# Patient Record
Sex: MALE | Employment: UNEMPLOYED | URBAN - NONMETROPOLITAN AREA
[De-identification: names, ages, dates, MRNs, and addresses within clinical notes are randomized per-mention and may not be internally consistent; named-entity substitution may affect disease eponyms.]

---

## 2019-08-28 ENCOUNTER — APPOINTMENT (OUTPATIENT)
Dept: CT IMAGING | Age: 54
DRG: 897 | End: 2019-08-28
Payer: COMMERCIAL

## 2019-08-28 ENCOUNTER — HOSPITAL ENCOUNTER (INPATIENT)
Age: 54
LOS: 2 days | Discharge: HOME | DRG: 897 | End: 2019-08-30
Attending: EMERGENCY MEDICINE | Admitting: INTERNAL MEDICINE
Payer: COMMERCIAL

## 2019-08-28 DIAGNOSIS — F10.921 ACUTE ALCOHOLIC INTOXICATION WITH DELIRIUM (HCC): Primary | ICD-10-CM

## 2019-08-28 PROBLEM — R93.89 ABNORMAL CT SCAN: Status: ACTIVE | Noted: 2019-08-28
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
## 2019-08-28 PROBLEM — M50.30 DDD (DEGENERATIVE DISC DISEASE), CERVICAL: Status: ACTIVE | Noted: 2019-08-28

## 2019-08-28 PROBLEM — R74.01 ELEVATED TRANSAMINASE LEVEL: Status: ACTIVE | Noted: 2019-08-28

## 2019-08-28 PROBLEM — F10.10 ALCOHOL ABUSE: Status: ACTIVE | Noted: 2019-08-28

## 2019-08-28 LAB
ACETAMINOPHEN LEVEL: < 5 UG/ML (ref 0–20)
ALBUMIN SERPL-MCNC: 4.7 G/DL (ref 3.5–5.1)
ALP BLD-CCNC: 89 U/L (ref 38–126)
ALT SERPL-CCNC: 62 U/L (ref 11–66)
AMPHETAMINE+METHAMPHETAMINE URINE SCREEN: NEGATIVE
ANION GAP SERPL CALCULATED.3IONS-SCNC: 17 MEQ/L (ref 8–16)
ANION GAP SERPL CALCULATED.3IONS-SCNC: 17 MEQ/L (ref 8–16)
APTT: 30.5 SECONDS (ref 22–38)
AST SERPL-CCNC: 85 U/L (ref 5–40)
BARBITURATE QUANTITATIVE URINE: NEGATIVE
BASE EXCESS MIXED: 1.5 MMOL/L (ref -2–3)
BASOPHILS # BLD: 0.6 %
BASOPHILS ABSOLUTE: 0 THOU/MM3 (ref 0–0.1)
BENZODIAZEPINE QUANTITATIVE URINE: NEGATIVE
BILIRUB SERPL-MCNC: 0.3 MG/DL (ref 0.3–1.2)
BILIRUBIN DIRECT: < 0.2 MG/DL (ref 0–0.3)
BILIRUBIN URINE: NEGATIVE
BLOOD, URINE: NEGATIVE
BUN BLDV-MCNC: 12 MG/DL (ref 7–22)
BUN BLDV-MCNC: 12 MG/DL (ref 7–22)
CALCIUM SERPL-MCNC: 8.9 MG/DL (ref 8.5–10.5)
CALCIUM SERPL-MCNC: 9.1 MG/DL (ref 8.5–10.5)
CANNABINOID QUANTITATIVE URINE: NEGATIVE
CHARACTER, URINE: CLEAR
CHLORIDE BLD-SCNC: 108 MEQ/L (ref 98–111)
CHLORIDE BLD-SCNC: 99 MEQ/L (ref 98–111)
CO2: 22 MEQ/L (ref 23–33)
CO2: 23 MEQ/L (ref 23–33)
COCAINE METABOLITE QUANTITATIVE URINE: NEGATIVE
COLLECTED BY:: ABNORMAL
COLOR: YELLOW
CREAT SERPL-MCNC: 0.7 MG/DL (ref 0.4–1.2)
CREAT SERPL-MCNC: 0.9 MG/DL (ref 0.4–1.2)
DEVICE: ABNORMAL
EKG ATRIAL RATE: 105 BPM
EKG ATRIAL RATE: 96 BPM
EKG P AXIS: 41 DEGREES
EKG P AXIS: 48 DEGREES
EKG P-R INTERVAL: 150 MS
EKG P-R INTERVAL: 166 MS
EKG Q-T INTERVAL: 346 MS
EKG Q-T INTERVAL: 366 MS
EKG QRS DURATION: 86 MS
EKG QRS DURATION: 92 MS
EKG QTC CALCULATION (BAZETT): 457 MS
EKG QTC CALCULATION (BAZETT): 462 MS
EKG R AXIS: -31 DEGREES
EKG R AXIS: 13 DEGREES
EKG T AXIS: 24 DEGREES
EKG T AXIS: 36 DEGREES
EKG VENTRICULAR RATE: 105 BPM
EKG VENTRICULAR RATE: 96 BPM
EOSINOPHIL # BLD: 1.2 %
EOSINOPHILS ABSOLUTE: 0.1 THOU/MM3 (ref 0–0.4)
ERYTHROCYTE [DISTWIDTH] IN BLOOD BY AUTOMATED COUNT: 12.8 % (ref 11.5–14.5)
ERYTHROCYTE [DISTWIDTH] IN BLOOD BY AUTOMATED COUNT: 13.1 % (ref 11.5–14.5)
ERYTHROCYTE [DISTWIDTH] IN BLOOD BY AUTOMATED COUNT: 41.7 FL (ref 35–45)
ERYTHROCYTE [DISTWIDTH] IN BLOOD BY AUTOMATED COUNT: 42.7 FL (ref 35–45)
ETHYL ALCOHOL, SERUM: 0.28 %
ETHYL ALCOHOL, SERUM: 0.56 %
FIO2, MIXED VENOUS: 2
GFR SERPL CREATININE-BSD FRML MDRD: 89 ML/MIN/1.73M2
GFR SERPL CREATININE-BSD FRML MDRD: > 90 ML/MIN/1.73M2
GLUCOSE BLD-MCNC: 119 MG/DL (ref 70–108)
GLUCOSE BLD-MCNC: 124 MG/DL (ref 70–108)
GLUCOSE URINE: NEGATIVE MG/DL
HCO3, MIXED: 27 MMOL/L (ref 23–28)
HCT VFR BLD CALC: 47.8 % (ref 42–52)
HCT VFR BLD CALC: 48.8 % (ref 42–52)
HEMOGLOBIN: 16.2 GM/DL (ref 14–18)
HEMOGLOBIN: 16.2 GM/DL (ref 14–18)
IMMATURE GRANS (ABS): 0.01 THOU/MM3 (ref 0–0.07)
IMMATURE GRANULOCYTES: 0 %
INR BLD: 0.9 (ref 0.85–1.13)
KETONES, URINE: NEGATIVE
LEUKOCYTE ESTERASE, URINE: NEGATIVE
LIPASE: 96.5 U/L (ref 5.6–51.3)
LYMPHOCYTES # BLD: 36.8 %
LYMPHOCYTES ABSOLUTE: 2.4 THOU/MM3 (ref 1–4.8)
MAGNESIUM: 2.2 MG/DL (ref 1.6–2.4)
MCH RBC QN AUTO: 29.9 PG (ref 26–33)
MCH RBC QN AUTO: 30.2 PG (ref 26–33)
MCHC RBC AUTO-ENTMCNC: 33.2 GM/DL (ref 32.2–35.5)
MCHC RBC AUTO-ENTMCNC: 33.9 GM/DL (ref 32.2–35.5)
MCV RBC AUTO: 89 FL (ref 80–94)
MCV RBC AUTO: 90.2 FL (ref 80–94)
MONOCYTES # BLD: 9.9 %
MONOCYTES ABSOLUTE: 0.6 THOU/MM3 (ref 0.4–1.3)
NITRITE, URINE: NEGATIVE
NUCLEATED RED BLOOD CELLS: 0 /100 WBC
O2 SAT, MIXED: 90 %
OPIATES, URINE: NEGATIVE
OSMOLALITY CALCULATION: 278.7 MOSMOL/KG (ref 275–300)
OXYCODONE: NEGATIVE
PCO2, MIXED VENOUS: 42 MMHG (ref 41–51)
PH UA: 6.5 (ref 5–9)
PH, MIXED: 7.41 (ref 7.31–7.41)
PHENCYCLIDINE QUANTITATIVE URINE: NEGATIVE
PLATELET # BLD: 146 THOU/MM3 (ref 130–400)
PLATELET # BLD: 148 THOU/MM3 (ref 130–400)
PMV BLD AUTO: 10.7 FL (ref 9.4–12.4)
PMV BLD AUTO: 10.8 FL (ref 9.4–12.4)
PO2 MIXED: 58 MMHG (ref 25–40)
POTASSIUM SERPL-SCNC: 3.6 MEQ/L (ref 3.5–5.2)
POTASSIUM SERPL-SCNC: 4.4 MEQ/L (ref 3.5–5.2)
PRO-BNP: 11.9 PG/ML (ref 0–900)
PROTEIN UA: NEGATIVE
RBC # BLD: 5.37 MILL/MM3 (ref 4.7–6.1)
RBC # BLD: 5.41 MILL/MM3 (ref 4.7–6.1)
SALICYLATE, SERUM: < 0.3 MG/DL (ref 2–10)
SEG NEUTROPHILS: 51.3 %
SEGMENTED NEUTROPHILS ABSOLUTE COUNT: 3.3 THOU/MM3 (ref 1.8–7.7)
SODIUM BLD-SCNC: 139 MEQ/L (ref 135–145)
SODIUM BLD-SCNC: 147 MEQ/L (ref 135–145)
SPECIFIC GRAVITY, URINE: 1.01 (ref 1–1.03)
TOTAL PROTEIN: 7 G/DL (ref 6.1–8)
TROPONIN T: < 0.01 NG/ML
TSH SERPL DL<=0.05 MIU/L-ACNC: 2.21 UIU/ML (ref 0.4–4.2)
UROBILINOGEN, URINE: 0.2 EU/DL (ref 0–1)
WBC # BLD: 4.4 THOU/MM3 (ref 4.8–10.8)
WBC # BLD: 6.5 THOU/MM3 (ref 4.8–10.8)

## 2019-08-28 PROCEDURE — G0480 DRUG TEST DEF 1-7 CLASSES: HCPCS

## 2019-08-28 PROCEDURE — 6820000001 HC L2 TRAUMA SURGERY EVALUATION

## 2019-08-28 PROCEDURE — 83880 ASSAY OF NATRIURETIC PEPTIDE: CPT

## 2019-08-28 PROCEDURE — 36415 COLL VENOUS BLD VENIPUNCTURE: CPT

## 2019-08-28 PROCEDURE — 93005 ELECTROCARDIOGRAM TRACING: CPT | Performed by: INTERNAL MEDICINE

## 2019-08-28 PROCEDURE — 84443 ASSAY THYROID STIM HORMONE: CPT

## 2019-08-28 PROCEDURE — 36600 WITHDRAWAL OF ARTERIAL BLOOD: CPT

## 2019-08-28 PROCEDURE — 6370000000 HC RX 637 (ALT 250 FOR IP): Performed by: INTERNAL MEDICINE

## 2019-08-28 PROCEDURE — 99223 1ST HOSP IP/OBS HIGH 75: CPT | Performed by: SURGERY

## 2019-08-28 PROCEDURE — 85025 COMPLETE CBC W/AUTO DIFF WBC: CPT

## 2019-08-28 PROCEDURE — 99220 PR INITIAL OBSERVATION CARE/DAY 70 MINUTES: CPT | Performed by: INTERNAL MEDICINE

## 2019-08-28 PROCEDURE — 85610 PROTHROMBIN TIME: CPT

## 2019-08-28 PROCEDURE — 2500000003 HC RX 250 WO HCPCS: Performed by: PHYSICIAN ASSISTANT

## 2019-08-28 PROCEDURE — 85027 COMPLETE CBC AUTOMATED: CPT

## 2019-08-28 PROCEDURE — 2580000003 HC RX 258: Performed by: INTERNAL MEDICINE

## 2019-08-28 PROCEDURE — 1200000003 HC TELEMETRY R&B

## 2019-08-28 PROCEDURE — 6360000002 HC RX W HCPCS: Performed by: EMERGENCY MEDICINE

## 2019-08-28 PROCEDURE — 76376 3D RENDER W/INTRP POSTPROCES: CPT

## 2019-08-28 PROCEDURE — 71260 CT THORAX DX C+: CPT

## 2019-08-28 PROCEDURE — 93010 ELECTROCARDIOGRAM REPORT: CPT | Performed by: NUCLEAR MEDICINE

## 2019-08-28 PROCEDURE — 85730 THROMBOPLASTIN TIME PARTIAL: CPT

## 2019-08-28 PROCEDURE — 70450 CT HEAD/BRAIN W/O DYE: CPT

## 2019-08-28 PROCEDURE — 93005 ELECTROCARDIOGRAM TRACING: CPT | Performed by: EMERGENCY MEDICINE

## 2019-08-28 PROCEDURE — 2709999900 HC NON-CHARGEABLE SUPPLY

## 2019-08-28 PROCEDURE — 94761 N-INVAS EAR/PLS OXIMETRY MLT: CPT

## 2019-08-28 PROCEDURE — 74177 CT ABD & PELVIS W/CONTRAST: CPT

## 2019-08-28 PROCEDURE — 2500000003 HC RX 250 WO HCPCS: Performed by: EMERGENCY MEDICINE

## 2019-08-28 PROCEDURE — 80053 COMPREHEN METABOLIC PANEL: CPT

## 2019-08-28 PROCEDURE — 72125 CT NECK SPINE W/O DYE: CPT

## 2019-08-28 PROCEDURE — 81003 URINALYSIS AUTO W/O SCOPE: CPT

## 2019-08-28 PROCEDURE — 80307 DRUG TEST PRSMV CHEM ANLYZR: CPT

## 2019-08-28 PROCEDURE — 80048 BASIC METABOLIC PNL TOTAL CA: CPT

## 2019-08-28 PROCEDURE — 83735 ASSAY OF MAGNESIUM: CPT

## 2019-08-28 PROCEDURE — 2580000003 HC RX 258: Performed by: EMERGENCY MEDICINE

## 2019-08-28 PROCEDURE — 2700000000 HC OXYGEN THERAPY PER DAY

## 2019-08-28 PROCEDURE — 6360000002 HC RX W HCPCS: Performed by: INTERNAL MEDICINE

## 2019-08-28 PROCEDURE — 83690 ASSAY OF LIPASE: CPT

## 2019-08-28 PROCEDURE — 99285 EMERGENCY DEPT VISIT HI MDM: CPT

## 2019-08-28 PROCEDURE — 84484 ASSAY OF TROPONIN QUANT: CPT

## 2019-08-28 PROCEDURE — 82248 BILIRUBIN DIRECT: CPT

## 2019-08-28 PROCEDURE — 6360000004 HC RX CONTRAST MEDICATION: Performed by: EMERGENCY MEDICINE

## 2019-08-28 PROCEDURE — L0120 CERV FLEX N/ADJ FOAM PRE OTS: HCPCS

## 2019-08-28 RX ORDER — LORAZEPAM 2 MG/ML
2 INJECTION INTRAMUSCULAR
Status: DISCONTINUED | OUTPATIENT
Start: 2019-08-28 | End: 2019-08-30 | Stop reason: HOSPADM

## 2019-08-28 RX ORDER — IBUPROFEN 400 MG/1
400 TABLET ORAL EVERY 6 HOURS PRN
Status: DISCONTINUED | OUTPATIENT
Start: 2019-08-28 | End: 2019-08-30 | Stop reason: HOSPADM

## 2019-08-28 RX ORDER — ONDANSETRON 2 MG/ML
4 INJECTION INTRAMUSCULAR; INTRAVENOUS EVERY 6 HOURS PRN
Status: DISCONTINUED | OUTPATIENT
Start: 2019-08-28 | End: 2019-08-30 | Stop reason: HOSPADM

## 2019-08-28 RX ORDER — SODIUM CHLORIDE 9 MG/ML
INJECTION, SOLUTION INTRAVENOUS CONTINUOUS
Status: DISCONTINUED | OUTPATIENT
Start: 2019-08-28 | End: 2019-08-30 | Stop reason: HOSPADM

## 2019-08-28 RX ORDER — SODIUM CHLORIDE 0.9 % (FLUSH) 0.9 %
10 SYRINGE (ML) INJECTION PRN
Status: DISCONTINUED | OUTPATIENT
Start: 2019-08-28 | End: 2019-08-30 | Stop reason: HOSPADM

## 2019-08-28 RX ORDER — LORAZEPAM 2 MG/ML
4 INJECTION INTRAMUSCULAR
Status: DISCONTINUED | OUTPATIENT
Start: 2019-08-28 | End: 2019-08-30 | Stop reason: HOSPADM

## 2019-08-28 RX ORDER — LORAZEPAM 1 MG/1
3 TABLET ORAL
Status: DISCONTINUED | OUTPATIENT
Start: 2019-08-28 | End: 2019-08-30 | Stop reason: HOSPADM

## 2019-08-28 RX ORDER — LORAZEPAM 1 MG/1
4 TABLET ORAL
Status: DISCONTINUED | OUTPATIENT
Start: 2019-08-28 | End: 2019-08-30 | Stop reason: HOSPADM

## 2019-08-28 RX ORDER — LORAZEPAM 1 MG/1
1 TABLET ORAL
Status: DISCONTINUED | OUTPATIENT
Start: 2019-08-28 | End: 2019-08-30 | Stop reason: HOSPADM

## 2019-08-28 RX ORDER — LORAZEPAM 1 MG/1
2 TABLET ORAL
Status: DISCONTINUED | OUTPATIENT
Start: 2019-08-28 | End: 2019-08-30 | Stop reason: HOSPADM

## 2019-08-28 RX ORDER — SODIUM CHLORIDE 450 MG/100ML
INJECTION, SOLUTION INTRAVENOUS CONTINUOUS
Status: DISCONTINUED | OUTPATIENT
Start: 2019-08-28 | End: 2019-08-30 | Stop reason: HOSPADM

## 2019-08-28 RX ORDER — SODIUM CHLORIDE 0.9 % (FLUSH) 0.9 %
10 SYRINGE (ML) INJECTION EVERY 12 HOURS SCHEDULED
Status: DISCONTINUED | OUTPATIENT
Start: 2019-08-28 | End: 2019-08-30 | Stop reason: HOSPADM

## 2019-08-28 RX ORDER — LORAZEPAM 2 MG/ML
3 INJECTION INTRAMUSCULAR
Status: DISCONTINUED | OUTPATIENT
Start: 2019-08-28 | End: 2019-08-30 | Stop reason: HOSPADM

## 2019-08-28 RX ORDER — LABETALOL 20 MG/4 ML (5 MG/ML) INTRAVENOUS SYRINGE
10 EVERY 6 HOURS PRN
Status: DISCONTINUED | OUTPATIENT
Start: 2019-08-28 | End: 2019-08-30 | Stop reason: HOSPADM

## 2019-08-28 RX ORDER — LORAZEPAM 2 MG/ML
1 INJECTION INTRAMUSCULAR
Status: DISCONTINUED | OUTPATIENT
Start: 2019-08-28 | End: 2019-08-30 | Stop reason: HOSPADM

## 2019-08-28 RX ORDER — ACETAMINOPHEN 325 MG/1
650 TABLET ORAL EVERY 4 HOURS PRN
Status: DISCONTINUED | OUTPATIENT
Start: 2019-08-28 | End: 2019-08-30 | Stop reason: HOSPADM

## 2019-08-28 RX ADMIN — LORAZEPAM 3 MG: 2 INJECTION INTRAMUSCULAR; INTRAVENOUS at 07:04

## 2019-08-28 RX ADMIN — SODIUM CHLORIDE: 4.5 INJECTION, SOLUTION INTRAVENOUS at 19:52

## 2019-08-28 RX ADMIN — FOLIC ACID: 5 INJECTION, SOLUTION INTRAMUSCULAR; INTRAVENOUS; SUBCUTANEOUS at 03:06

## 2019-08-28 RX ADMIN — ACETAMINOPHEN 650 MG: 325 TABLET ORAL at 08:36

## 2019-08-28 RX ADMIN — SODIUM CHLORIDE: 9 INJECTION, SOLUTION INTRAVENOUS at 10:18

## 2019-08-28 RX ADMIN — IOPAMIDOL 80 ML: 755 INJECTION, SOLUTION INTRAVENOUS at 00:47

## 2019-08-28 RX ADMIN — IBUPROFEN 400 MG: 400 TABLET ORAL at 16:31

## 2019-08-28 RX ADMIN — LABETALOL 20 MG/4 ML (5 MG/ML) INTRAVENOUS SYRINGE 10 MG: at 23:37

## 2019-08-28 ASSESSMENT — PAIN DESCRIPTION - DESCRIPTORS: DESCRIPTORS: HEADACHE

## 2019-08-28 ASSESSMENT — PAIN DESCRIPTION - LOCATION: LOCATION: HEAD

## 2019-08-28 ASSESSMENT — PAIN DESCRIPTION - PROGRESSION
CLINICAL_PROGRESSION: GRADUALLY IMPROVING

## 2019-08-28 ASSESSMENT — PAIN SCALES - GENERAL
PAINLEVEL_OUTOF10: 8
PAINLEVEL_OUTOF10: 0
PAINLEVEL_OUTOF10: 0
PAINLEVEL_OUTOF10: 4

## 2019-08-28 ASSESSMENT — PAIN DESCRIPTION - FREQUENCY: FREQUENCY: INTERMITTENT

## 2019-08-28 ASSESSMENT — PAIN DESCRIPTION - PAIN TYPE: TYPE: ACUTE PAIN

## 2019-08-28 ASSESSMENT — PAIN DESCRIPTION - ONSET: ONSET: SUDDEN

## 2019-08-28 ASSESSMENT — PAIN - FUNCTIONAL ASSESSMENT: PAIN_FUNCTIONAL_ASSESSMENT: ACTIVITIES ARE NOT PREVENTED

## 2019-08-28 NOTE — ED PROVIDER NOTES
BLOOD GAS, VENOUS - Abnormal; Notable for the following components:    PO2, Mixed 58 (*)     All other components within normal limits   ANION GAP - Abnormal; Notable for the following components:    Anion Gap 17.0 (*)     All other components within normal limits   GLOMERULAR FILTRATION RATE, ESTIMATED - Abnormal; Notable for the following components:    Est, Glom Filt Rate 89 (*)     All other components within normal limits   PROTIME-INR   APTT   CBC WITH AUTO DIFFERENTIAL   BRAIN NATRIURETIC PEPTIDE   TROPONIN   MAGNESIUM   TSH WITHOUT REFLEX   ACETAMINOPHEN LEVEL   ETHANOL   URINE DRUG SCREEN   URINE RT REFLEX TO CULTURE   OSMOLALITY   BLOOD GAS, ARTERIAL   URINE DRUG SCREEN   TROPONIN   TROPONIN   CBC   BASIC METABOLIC PANEL       EMERGENCY DEPARTMENT COURSE:   Vitals:    Vitals:    08/28/19 0112 08/28/19 0122 08/28/19 0157 08/28/19 0257   BP: (!) 144/102  (!) 157/115 (!) 155/117   Pulse: 92  103 111   Resp: 19 23 19   Temp:  98 °F (36.7 °C)     SpO2: 96%  95% 97%     0020 Labs and imaging ordered. Patient will open eyes to his name, but is otherwise unresponsive. He does not respond to sternal rub, per nurse    The patient comes in today with acute alcohol intoxication resulting in a fall. Wife tells us patient has been drinking for the past 3 days. Patient came in by EMS on responsive to name. He is mildly hypertensive. Vital signs are otherwise within normal limits. He was placed on O2 NC with sonorous respirations. EKG shows NSR. He does not respond to sternal rub or painful stimuli. Patient apparently fell down 4 stair steps, striking his head. C-collar was on upon arrival. Labs and imaging studies were ordered. ETOH is 0.56. Urine drug screen is negative. AST is 85. Lipase is 96.5. GFR is 89. Anion gap is 17.0. ABG was ordered. PO2 is 58. Imaging including CT thoracic/lumbar reconstruction, head, cervical spine, chest, and abdomen pelvis have no acute findings.  I discussed patient's case with

## 2019-08-28 NOTE — H&P
atelectasis lung base changes. CT lumbar spine with reconstruction. FINDINGS: There is preservation of alignment of the vertebral column with multilevel degenerative endplate changes. There is no gross retropulsion. The paraspinal soft tissues are intact. There is redemonstration of old nonunited transverse process    fracture at L3 on the left. Disc levels: L1-2: The disc space is maintained. The central canal and foramen are normal.      L2-3: The disc space is within normal limits. The central canal and foramen are normal.      L3-4: There are degenerative disc changes without acute central canal or obvious foramen impingement. L4-5: There is posterior disc bulge which combines with facet arthropathy and ligamentum flavum thickening produce chronic appearing central canal narrowing. There is no acute foraminal impingement. L5-S1: There is degenerative disc narrowing with posterior broad-based disc bulge which does not produce severe narrowing of the central canal. Bilateral foramen are intact. There is mild facet arthropathy. There is no visualized aneurysm. Left and right kidney are negative for obstructive uropathy. There is no paraspinal fluid collections. IMPRESSION:   1. Negative exam for acute fracture deformity of the lumbar spine or obvious impingement of the central canal.         2. Prior nonunited fracture changes of the left L3 transverse process. 3. Degenerative disc and endplate changes described above at individual levels. **This report has been created using voice recognition software. It may contain minor errors which are inherent in voice recognition technology. **      Final report electronically signed by Dr. Stefan Thomason on 8/28/2019 1:33 AM      CT CHEST W CONTRAST   Final Result   Negative CT of the chest for active appearing pathology.       CT of the thoracic spine with reconstruction      Indication trauma      FINDINGS: There is overall IMPRESSION:   1. Multilevel degenerative changes of the thoracic spine. Negative study for acute fracture or deformity of the central canal.      **This report has been created using voice recognition software. It may contain minor errors which are inherent in voice recognition technology. **      Final report electronically signed by Dr. Ginna Willis on 8/28/2019 1:26 AM               DVT prophylaxis: [] Lovenox                                 [x] SCDs                                 [] SQ Heparin                                 [] Encourage ambulation           [] Already on Anticoagulation    Code Status: Full Code      PT/OT Eval Status: Encouraged if warranted    Disposition:    [] Home       [] TCU       [] Rehab       [] Psych       [] SNF       [] [facility]       [x] Other- Need further investigation    ASSESSMENT:    Active Hospital Problems    Diagnosis Date Noted    Fall [W19. XXXA] 08/28/2019    Alcohol abuse [F10.10] 08/28/2019    DDD (degenerative disc disease), cervical [M50.30] 08/28/2019    Abnormal CT scan [R93.89] 08/28/2019    Elevated transaminase level [R74.0] 08/28/2019       PLAN:    Admit to Telemetry Unit  C-Collar to remain in place until cleared by Trauma Team  Diet regular  IVF hydration as tolerated  Analgesics PRN  Antiemetics PRN  DVT prophylaxis  PT/OT encouraged, if warranted  IS  Troponin trend  EKG in AM  Consult trauma surgery  Will need to re-evaluate home medications in morning  Continue to monitor closely       Thank you No primary care provider on file. for the opportunity to be involved in this patient's care.     Electronically signed by Rere Yung DO on 8/28/2019 at 6:02 AM

## 2019-08-29 LAB
ALBUMIN SERPL-MCNC: 4.6 G/DL (ref 3.5–5.1)
ALP BLD-CCNC: 84 U/L (ref 38–126)
ALT SERPL-CCNC: 61 U/L (ref 11–66)
ANION GAP SERPL CALCULATED.3IONS-SCNC: 14 MEQ/L (ref 8–16)
AST SERPL-CCNC: 87 U/L (ref 5–40)
BASOPHILS # BLD: 0.7 %
BASOPHILS ABSOLUTE: 0 THOU/MM3 (ref 0–0.1)
BILIRUB SERPL-MCNC: 1.5 MG/DL (ref 0.3–1.2)
BILIRUBIN DIRECT: 0.4 MG/DL (ref 0–0.3)
BUN BLDV-MCNC: 8 MG/DL (ref 7–22)
CALCIUM SERPL-MCNC: 9.5 MG/DL (ref 8.5–10.5)
CHLORIDE BLD-SCNC: 98 MEQ/L (ref 98–111)
CO2: 26 MEQ/L (ref 23–33)
CREAT SERPL-MCNC: 0.7 MG/DL (ref 0.4–1.2)
EOSINOPHIL # BLD: 2 %
EOSINOPHILS ABSOLUTE: 0.1 THOU/MM3 (ref 0–0.4)
ERYTHROCYTE [DISTWIDTH] IN BLOOD BY AUTOMATED COUNT: 12.4 % (ref 11.5–14.5)
ERYTHROCYTE [DISTWIDTH] IN BLOOD BY AUTOMATED COUNT: 40.1 FL (ref 35–45)
ETHYL ALCOHOL, SERUM: < 0.01 %
GFR SERPL CREATININE-BSD FRML MDRD: > 90 ML/MIN/1.73M2
GLUCOSE BLD-MCNC: 108 MG/DL (ref 70–108)
HCT VFR BLD CALC: 44.9 % (ref 42–52)
HEMOGLOBIN: 15.5 GM/DL (ref 14–18)
IMMATURE GRANS (ABS): 0.02 THOU/MM3 (ref 0–0.07)
IMMATURE GRANULOCYTES: 1 %
LYMPHOCYTES # BLD: 24.1 %
LYMPHOCYTES ABSOLUTE: 1.1 THOU/MM3 (ref 1–4.8)
MAGNESIUM: 1.4 MG/DL (ref 1.6–2.4)
MCH RBC QN AUTO: 30.5 PG (ref 26–33)
MCHC RBC AUTO-ENTMCNC: 34.5 GM/DL (ref 32.2–35.5)
MCV RBC AUTO: 88.4 FL (ref 80–94)
MONOCYTES # BLD: 13.3 %
MONOCYTES ABSOLUTE: 0.6 THOU/MM3 (ref 0.4–1.3)
NUCLEATED RED BLOOD CELLS: 0 /100 WBC
PLATELET # BLD: 114 THOU/MM3 (ref 130–400)
PMV BLD AUTO: 10.7 FL (ref 9.4–12.4)
POTASSIUM SERPL-SCNC: 3.9 MEQ/L (ref 3.5–5.2)
RBC # BLD: 5.08 MILL/MM3 (ref 4.7–6.1)
SEG NEUTROPHILS: 59.4 %
SEGMENTED NEUTROPHILS ABSOLUTE COUNT: 2.6 THOU/MM3 (ref 1.8–7.7)
SODIUM BLD-SCNC: 138 MEQ/L (ref 135–145)
TOTAL PROTEIN: 6.7 G/DL (ref 6.1–8)
WBC # BLD: 4.4 THOU/MM3 (ref 4.8–10.8)

## 2019-08-29 PROCEDURE — 2500000003 HC RX 250 WO HCPCS: Performed by: PHYSICIAN ASSISTANT

## 2019-08-29 PROCEDURE — 1200000003 HC TELEMETRY R&B

## 2019-08-29 PROCEDURE — 80053 COMPREHEN METABOLIC PANEL: CPT

## 2019-08-29 PROCEDURE — 85025 COMPLETE CBC W/AUTO DIFF WBC: CPT

## 2019-08-29 PROCEDURE — 83735 ASSAY OF MAGNESIUM: CPT

## 2019-08-29 PROCEDURE — 2580000003 HC RX 258: Performed by: INTERNAL MEDICINE

## 2019-08-29 PROCEDURE — 99233 SBSQ HOSP IP/OBS HIGH 50: CPT | Performed by: PHYSICIAN ASSISTANT

## 2019-08-29 PROCEDURE — 82248 BILIRUBIN DIRECT: CPT

## 2019-08-29 PROCEDURE — 6370000000 HC RX 637 (ALT 250 FOR IP): Performed by: PHYSICIAN ASSISTANT

## 2019-08-29 PROCEDURE — 36415 COLL VENOUS BLD VENIPUNCTURE: CPT

## 2019-08-29 PROCEDURE — G0480 DRUG TEST DEF 1-7 CLASSES: HCPCS

## 2019-08-29 RX ORDER — LISINOPRIL 10 MG/1
10 TABLET ORAL DAILY
Status: DISCONTINUED | OUTPATIENT
Start: 2019-08-29 | End: 2019-08-30 | Stop reason: HOSPADM

## 2019-08-29 RX ADMIN — SODIUM CHLORIDE: 4.5 INJECTION, SOLUTION INTRAVENOUS at 04:27

## 2019-08-29 RX ADMIN — LABETALOL 20 MG/4 ML (5 MG/ML) INTRAVENOUS SYRINGE 10 MG: at 13:46

## 2019-08-29 RX ADMIN — LISINOPRIL 10 MG: 10 TABLET ORAL at 18:49

## 2019-08-29 ASSESSMENT — PAIN SCALES - GENERAL
PAINLEVEL_OUTOF10: 0
PAINLEVEL_OUTOF10: 0

## 2019-08-30 VITALS
SYSTOLIC BLOOD PRESSURE: 148 MMHG | HEART RATE: 60 BPM | OXYGEN SATURATION: 96 % | TEMPERATURE: 98.1 F | DIASTOLIC BLOOD PRESSURE: 93 MMHG | RESPIRATION RATE: 16 BRPM

## 2019-08-30 PROCEDURE — 6370000000 HC RX 637 (ALT 250 FOR IP): Performed by: PHYSICIAN ASSISTANT

## 2019-08-30 PROCEDURE — 99239 HOSP IP/OBS DSCHRG MGMT >30: CPT | Performed by: PHYSICIAN ASSISTANT

## 2019-08-30 PROCEDURE — 6370000000 HC RX 637 (ALT 250 FOR IP): Performed by: INTERNAL MEDICINE

## 2019-08-30 PROCEDURE — 2580000003 HC RX 258: Performed by: INTERNAL MEDICINE

## 2019-08-30 RX ORDER — LISINOPRIL 10 MG/1
10 TABLET ORAL DAILY
Qty: 30 TABLET | Refills: 3 | Status: SHIPPED | OUTPATIENT
Start: 2019-08-31

## 2019-08-30 RX ADMIN — IBUPROFEN 400 MG: 400 TABLET ORAL at 02:25

## 2019-08-30 RX ADMIN — SODIUM CHLORIDE: 4.5 INJECTION, SOLUTION INTRAVENOUS at 02:23

## 2019-08-30 RX ADMIN — LISINOPRIL 10 MG: 10 TABLET ORAL at 09:48

## 2019-08-30 ASSESSMENT — PAIN SCALES - GENERAL
PAINLEVEL_OUTOF10: 5
PAINLEVEL_OUTOF10: 0

## 2019-08-30 ASSESSMENT — PAIN DESCRIPTION - PAIN TYPE: TYPE: ACUTE PAIN

## 2019-08-30 ASSESSMENT — PAIN DESCRIPTION - LOCATION: LOCATION: HEAD

## 2019-08-30 ASSESSMENT — PAIN DESCRIPTION - ORIENTATION: ORIENTATION: MID;RIGHT

## 2019-08-30 ASSESSMENT — PAIN DESCRIPTION - DESCRIPTORS: DESCRIPTORS: ACHING

## 2019-08-30 NOTE — PLAN OF CARE
Problem: Falls - Risk of:  Goal: Will remain free from falls  Description  Will remain free from falls  Outcome: Ongoing  Note:   Up with standby assist gait is steady no falls this shift      Problem: Falls - Risk of:  Goal: Absence of physical injury  Description  Absence of physical injury  Outcome: Ongoing  Note:   No evidence of physical injury noted      Problem: Fluid Volume - Deficit:  Goal: Absence of fluid volume deficit signs and symptoms  Description  Absence of fluid volume deficit signs and symptoms  Outcome: Ongoing  Note:   Taking in adequate amounts of fluids      Problem: Nutrition Deficit:  Goal: Ability to achieve adequate nutritional intake will improve  Description  Ability to achieve adequate nutritional intake will improve  Outcome: Ongoing  Note:   Taking in adequate amounts of nutrition      Problem: Sleep Pattern Disturbance:  Goal: Appears well-rested  Description  Appears well-rested  Outcome: Ongoing  Note:   Frequent periods of rest appears well rested      Problem: Violence - Risk of, Self/Other-Directed:  Goal: Knowledge of developmental care interventions  Description  Absence of violence  Outcome: Ongoing  Note:   Calm and cooperative no violent actions noted this shift      Problem: Pain:  Goal: Pain level will decrease  Description  Pain level will decrease  Outcome: Ongoing  Note:   Denies any pain this shift      Problem: Discharge Planning:  Goal: Discharged to appropriate level of care  Description  Discharged to appropriate level of care  Outcome: Ongoing  Note:   Plans home with wife at discharge vs going to an alcohol withdrawal facility   Care plan reviewed with patient. Patient verbalize understanding of the plan of care and contribute to goal setting.

## 2019-08-30 NOTE — DISCHARGE SUMMARY
Ativan. C-Collar removed. Trauma signed off.   8/30 - Pt did not need any Ativan for the last 48 hours ago. Pt did not show any symptoms of lethal alcohol WD. Pt agreed with going home and thought he was safe. Pt was in touch with a rehab center in [place]. Pt stated that he did not need anything from our . On the day of discharge, it was explained to the patient that it was very important to follow up with his PCP to have continued care. Appointments were made and information was given. Exam:     Vitals:  Vitals:    08/30/19 0001 08/30/19 0002 08/30/19 0400 08/30/19 0800   BP: (!) 166/102 (!) 152/100 (!) 148/95 (!) 148/93   Pulse: 83 85 73 60   Resp: 16 16  16   Temp:  98 °F (36.7 °C)  98.1 °F (36.7 °C)   TempSrc:  Oral  Oral   SpO2: 97% 96%  96%     Weight:       24 hour intake/output:    Intake/Output Summary (Last 24 hours) at 8/30/2019 1130  Last data filed at 8/30/2019 0600  Gross per 24 hour   Intake 2675.6 ml   Output --   Net 2675.6 ml         General appearance:  No apparent distress, appears stated age and cooperative. HEENT:  Normal cephalic, atraumatic without obvious deformity. Pupils equal, round, and reactive to light. Extra ocular muscles intact. Conjunctivae/corneas clear. Neck: Supple, with full range of motion. No jugular venous distention. Trachea midline. Respiratory:  Normal respiratory effort on RA. Clear to auscultation, bilaterally without Rales/Wheezes/Rhonchi. Cardiovascular:  Regular rate and rhythm with normal S1/S2 without murmurs, rubs or gallops. Abdomen: Soft, non-tender, non-distended with normal bowel sounds. Musculoskeletal:  No clubbing, cyanosis or edema bilaterally. Full range of motion without deformity. Skin: Skin color, texture, turgor normal.  No rashes or lesions. Neurologic:  Neurovascularly intact without any focal sensory/motor deficits.  Cranial nerves: II-XII intact, grossly non-focal.  Psychiatric:  Alert and oriented, thought content Final Result    No evidence of an acute process. **This report has been created using voice recognition software. It may contain minor errors which are inherent in voice recognition technology. **      Final report electronically signed by Dr. Detsiny Jordan on 8/28/2019 1:17 AM      CT LUMBAR RECONSTRUCTION WO POST PROCESS   Final Result   1. Old ununited fracture changes of the left L3 transverse process. 2. Negative exam of the abdomen and pelvis for acute abnormality. 3. Minimal bibasilar subsegmental atelectasis lung base changes. CT lumbar spine with reconstruction. FINDINGS: There is preservation of alignment of the vertebral column with multilevel degenerative endplate changes. There is no gross retropulsion. The paraspinal soft tissues are intact. There is redemonstration of old nonunited transverse process    fracture at L3 on the left. Disc levels: L1-2: The disc space is maintained. The central canal and foramen are normal.      L2-3: The disc space is within normal limits. The central canal and foramen are normal.      L3-4: There are degenerative disc changes without acute central canal or obvious foramen impingement. L4-5: There is posterior disc bulge which combines with facet arthropathy and ligamentum flavum thickening produce chronic appearing central canal narrowing. There is no acute foraminal impingement. L5-S1: There is degenerative disc narrowing with posterior broad-based disc bulge which does not produce severe narrowing of the central canal. Bilateral foramen are intact. There is mild facet arthropathy. There is no visualized aneurysm. Left and right kidney are negative for obstructive uropathy. There is no paraspinal fluid collections. IMPRESSION:   1.  Negative exam for acute fracture deformity of the lumbar spine or obvious impingement of the central canal.         2. Prior nonunited fracture changes of the left L3 transverse process. 3. Degenerative disc and endplate changes described above at individual levels. **This report has been created using voice recognition software. It may contain minor errors which are inherent in voice recognition technology. **      Final report electronically signed by Dr. Gigi Irwin on 8/28/2019 1:33 AM      CT THORACIC RECONSTRUCTION WO POST PROCESS   Final Result   Negative CT of the chest for active appearing pathology. CT of the thoracic spine with reconstruction      Indication trauma      FINDINGS: There is overall preservation of alignment of the vertebral column with multilevel degenerative endplate changes and mild disc narrowing. There is no acute retropulsion or deformity the central canal as visualized. The paraspinal soft tissues    are grossly normal. The descending thoracic aorta is normal caliber. IMPRESSION:   1. Multilevel degenerative changes of the thoracic spine. Negative study for acute fracture or deformity of the central canal.      **This report has been created using voice recognition software. It may contain minor errors which are inherent in voice recognition technology. **      Final report electronically signed by Dr. Gigi Irwin on 8/28/2019 1:26 AM             Consults:     STR ED TO IP CONSULT  IP CONSULT TO TRAUMA SURGERY  IP CONSULT TO ADDICTION SERVICES    Disposition:    [x] Home       [] TCU       [] Rehab       [] Psych       [] SNF       [] [facility]       [] Other-    Condition at Discharge: Stable    Code Status:  Full Code     Patient Instructions:    Discharge lab work: None at this time   Activity: activity as tolerated  Diet: DIET GENERAL;      Follow-up visits:   Stanley Lamb MD  [facility] [address]  [phone]    In 1 week           Discharge Medications:      Kory Hollingsworth, [address] Medication Instructions [id number]    Printed on:08/30/19 4744   Medication Information
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
## 2022-11-06 NOTE — ED NOTES
Bed: 007A  Expected date:   Expected time:   Means of arrival:   Comments:     Afia Aguirre RN  08/28/19 0009
Spoke with pt wife Darci Dillard and updated on POC and planned admission to 7k24. She stated she would be up in the morning after dropping kids off at school and to call for any problems.  Deena Lozada [phone]     Via Yasmeen Brooks RN  08/28/19 9238
96

## 2024-01-01 ENCOUNTER — HOSPITAL ENCOUNTER (EMERGENCY)
Age: 59
End: 2024-03-06
Attending: EMERGENCY MEDICINE

## 2024-01-01 DIAGNOSIS — I46.9 CARDIAC ARREST (HCC): Primary | ICD-10-CM

## 2024-01-01 LAB
BASE EXCESS BLDA CALC-SCNC: -29.5 MMOL/L (ref -2–3)
CA-I BLD ISE-SCNC: 1.32 MMOL/L (ref 1.12–1.32)
CHLORIDE BLD-SCNC: 113 MEQ/L (ref 98–109)
COLLECTED BY:: ABNORMAL
GLUCOSE BLD STRIP.AUTO-MCNC: 115 MG/DL (ref 70–108)
GLUCOSE BLD-MCNC: 136 MG/DL (ref 70–108)
HCO3 BLDA-SCNC: 12 MMOL/L (ref 23–28)
LACTATE BLD-SCNC: > 20 MMOL/L (ref 0.5–1.9)
PCO2 TEMP ADJ BLDMV: 143 MMHG (ref 41–51)
PH BLDMV: 6.55 [PH] (ref 7.31–7.41)
PO2 BLDMV: 25 MMHG (ref 25–40)
POC CREATININE WHOLE BLOOD: 2.1 MG/DL (ref 0.5–1.2)
POTASSIUM BLD-SCNC: 7.4 MEQ/L (ref 3.5–4.9)
SAO2 % BLDMV: 10 %
SODIUM BLD-SCNC: 144 MEQ/L (ref 138–146)

## 2024-01-01 PROCEDURE — 83605 ASSAY OF LACTIC ACID: CPT

## 2024-01-01 PROCEDURE — 82435 ASSAY OF BLOOD CHLORIDE: CPT

## 2024-01-01 PROCEDURE — 82947 ASSAY GLUCOSE BLOOD QUANT: CPT

## 2024-01-01 PROCEDURE — 99285 EMERGENCY DEPT VISIT HI MDM: CPT

## 2024-01-01 PROCEDURE — 92950 HEART/LUNG RESUSCITATION CPR: CPT

## 2024-01-01 PROCEDURE — 6360000002 HC RX W HCPCS: Performed by: STUDENT IN AN ORGANIZED HEALTH CARE EDUCATION/TRAINING PROGRAM

## 2024-01-01 PROCEDURE — 82330 ASSAY OF CALCIUM: CPT

## 2024-01-01 PROCEDURE — 82948 REAGENT STRIP/BLOOD GLUCOSE: CPT

## 2024-01-01 PROCEDURE — 36415 COLL VENOUS BLD VENIPUNCTURE: CPT

## 2024-01-01 PROCEDURE — 82803 BLOOD GASES ANY COMBINATION: CPT

## 2024-01-01 PROCEDURE — 84132 ASSAY OF SERUM POTASSIUM: CPT

## 2024-01-01 PROCEDURE — 2500000003 HC RX 250 WO HCPCS: Performed by: STUDENT IN AN ORGANIZED HEALTH CARE EDUCATION/TRAINING PROGRAM

## 2024-01-01 PROCEDURE — 96374 THER/PROPH/DIAG INJ IV PUSH: CPT

## 2024-01-01 PROCEDURE — 84295 ASSAY OF SERUM SODIUM: CPT

## 2024-01-01 PROCEDURE — 82565 ASSAY OF CREATININE: CPT

## 2024-01-01 RX ORDER — EPINEPHRINE IN SOD CHLOR,ISO 1 MG/10 ML
SYRINGE (ML) INTRAVENOUS DAILY PRN
Status: COMPLETED | OUTPATIENT
Start: 2024-01-01 | End: 2024-01-01

## 2024-01-01 RX ORDER — MAGNESIUM SULFATE HEPTAHYDRATE 500 MG/ML
INJECTION, SOLUTION INTRAMUSCULAR; INTRAVENOUS DAILY PRN
Status: COMPLETED | OUTPATIENT
Start: 2024-01-01 | End: 2024-01-01

## 2024-01-01 RX ORDER — CALCIUM CHLORIDE 100 MG/ML
INJECTION INTRAVENOUS; INTRAVENTRICULAR DAILY PRN
Status: COMPLETED | OUTPATIENT
Start: 2024-01-01 | End: 2024-01-01

## 2024-01-01 RX ADMIN — Medication 1 MG: at 21:43

## 2024-01-01 RX ADMIN — Medication 1 MG: at 21:24

## 2024-01-01 RX ADMIN — Medication 1 MG: at 21:38

## 2024-01-01 RX ADMIN — Medication 1 MG: at 21:33

## 2024-01-01 RX ADMIN — SODIUM BICARBONATE 50 MEQ: 84 INJECTION, SOLUTION INTRAVENOUS at 21:37

## 2024-01-01 RX ADMIN — SODIUM BICARBONATE 50 MEQ: 84 INJECTION, SOLUTION INTRAVENOUS at 21:27

## 2024-01-01 RX ADMIN — CALCIUM CHLORIDE 1000 MG: 100 INJECTION, SOLUTION INTRAVENOUS at 21:36

## 2024-01-01 RX ADMIN — Medication 1 MG: at 21:28

## 2024-01-01 RX ADMIN — SODIUM BICARBONATE 50 MEQ: 84 INJECTION, SOLUTION INTRAVENOUS at 21:26

## 2024-01-01 RX ADMIN — MAGNESIUM SULFATE 2000 MG: 500 INJECTION, SOLUTION INTRAMUSCULAR; INTRAVENOUS at 21:24

## 2024-03-06 PROCEDURE — 31500 INSERT EMERGENCY AIRWAY: CPT

## 2024-03-06 NOTE — ED PROVIDER NOTES
[facility] Emergency Department    Pt Name: Wilian Bill  MRN: [id number]  Birthdate 1965  Date of evaluation: 3/5/2024  Resident Physician: Michael Justice MD  Attending Physician: Prema Kauffman MD      CHIEF COMPLAINT     No chief complaint on file.    HISTORY OF PRESENT ILLNESS   Wilian Bill is a 58 y.o. male who presents to the emergency department in cardiopulmonary arrest.  Per EMS, patient collapsed and family began chest compressions.  On arrival a Jeff airway was placed.  Patient was in ventricular fibrillation and received 1 defibrillation.    The patient has no other acute complaints at this time.  PASTMEDICAL HISTORY   No past medical history on file.    Patient Active Problem List   Diagnosis Code    Alcohol abuse F10.10    DDD (degenerative disc disease), cervical M50.30    Abnormal CT scan R93.89    Elevated transaminase level R74.01    Acute alcoholic intoxication with delirium (HCC) F10.921     SURGICAL HISTORY     No past surgical history on file.    CURRENT MEDICATIONS       Previous Medications    LISINOPRIL (PRINIVIL;ZESTRIL) 10 MG TABLET    Take 1 tablet by mouth daily       ALLERGIES     has No Known Allergies.    FAMILY HISTORY     has no family status information on file.        SOCIAL HISTORY       Social History     Tobacco Use    Smoking status: Unknown   Substance Use Topics    Alcohol use: Yes       PHYSICAL EXAM       ED Triage Vitals [03/05/24 2125]   BP Temp Temp src Pulse Resp SpO2 Height Weight   -- -- -- (!) 0 -- -- -- --       Physical Exam  Constitutional:       Appearance: He is ill-appearing.   Cardiovascular:      Comments: Asystole  Pulmonary:      Comments: Jeff airway in place.  Bilateral breath sounds present  Skin:     Comments: Cold extremity           FORMAL DIAGNOSTIC RESULTS     RADIOLOGY: Interpretation per the Radiologist below, if available at the time of this note (none if blank):    No orders to display       LABS: (none if blank)  Labs Reviewed   POTASSIUM,  abuse therefore there was concern for possible calcium channel blockade.  Calcium was given as potassium was elevated.  After several rounds of CPR, unfortunately ROSC was unable to be achieved.  Family was brought in room during resuscitation at the request.  All questions were answered to  ED Medications administered this visit:  (None if blank)  Medications   magnesium sulfate injection (2,000 mg IntraVENous Given 3/5/24 2124)   EPINEPHrine 1 MG/10ML injection (1 mg IntraVENous Given 3/5/24 2143)   sodium bicarbonate 8.4 % injection (50 mEq IntraVENous Given 3/5/24 2137)   calcium chloride 10 % injection (1,000 mg IntraVENous Given 3/5/24 2136)       PROCEDURES: (None if blank)  Procedures:     CRITICAL CARE: (Please see Attending note / Attestation regarding Critical Care Time. )        FINAL IMPRESSION      1. Cardiac arrest (HCC)          DISPOSITION/PLAN       Condition: condition:   Dispo: Other Disposition:   DISPOSITION  2024 11:33:35 PM          Outpatient follow up (If applicable):  No follow-up provider specified.  Not applicable          DISCHARGE PRESCRIPTIONS: (None if blank)  New Prescriptions    No medications on file         This transcription was electronically signed. Parts of this transcriptions may have been dictated by use of voice recognition software and electronically transcribed, and parts may have been transcribed with the assistance of an ED scribe. The transcription may contain errors not detected in proofreading.  Please refer to my supervising physician's documentation if my documentation differs.    Electronically Signed: Michael Justice MD, 24, 11:33 PM

## 2024-03-06 NOTE — ED PROVIDER NOTES
Critical Care    Performed by: Prema Kauffman MD  Authorized by: Prema Kauffman MD    Critical care provider statement:     Critical care time (minutes):  45    Critical care time was exclusive of:  Separately billable procedures and treating other patients and teaching time    Critical care was necessary to treat or prevent imminent or life-threatening deterioration of the following conditions:  Cardiac failure    Critical care was time spent personally by me on the following activities:  Blood draw for specimens, development of treatment plan with patient or surrogate, evaluation of patient's response to treatment, examination of patient, gastric intubation, interpretation of cardiac output measurements, obtaining history from patient or surrogate, ordering and performing treatments and interventions, ordering and review of laboratory studies, pulse oximetry, re-evaluation of patient's condition, vascular access procedures and ventilator management    I assumed direction of critical care for this patient from another provider in my specialty: Prema Gilbert MD  03/05/24 3193

## 2024-03-06 NOTE — PROGRESS NOTES
[facility]   PROGRESS NOTE      Patient: Wilian Bill  Room #: 02/002A            YOB: 1965  Age: 58 y.o.  Gender: male            Admit Date & Time: 3/5/2024  9:22 PM    Situation:    This is a spiritual health encounter in response to notification of patient expiration. Patient's spouse, Jennifer, and two friends were present at bedside with children en route.     Assessment:  Patient's spouse, Jennifer, tearfully shared about patient and processed how/when to tell her children the news of patient's death. Jennifer and family friends shared about patient's fun-loving nature and expressed their shock at the events of the day.     Intervention:   provided empathic listening, facilitated storytelling and therapeutic silence. Per family request,  facilitated [employer] needs request for prayers of the dead, provided by Fr. Antwan Cancino.  In addition,  collaborated with medical team to provide decedent care information. Interventions met with gratitude by family.    Plan:   team will remain available to support family, PRN.         Electronically signed by Chaplain Antonio Manzano M.Div, on 3/5/2024 at 11:26 PM.     Spiritual Care Department  [place] [id number]  [phone]

## 2024-03-06 NOTE — ED TRIAGE NOTES
Patient presents to ED from home via EMS for a code situation. Per EMS, 911 was called at 2054, wife found patient was found on the floor, unsure of down time and was coached for CPR at this time. Patient was vfib on the monitor for EMS, shock was delivered x2, patient was asystole on monitor. EMS gave 2 epi in route, last one being at 2116. Patient moved to ED stretcher, compressions continued at this time.

## 2024-03-06 NOTE — ED PROVIDER NOTES
Intubation    Date/Time: 3/5/2024 9:57 PM    Performed by: Tez Joseph MD  Authorized by: Prema Kauffman MD    Consent:     Consent obtained:  Emergent situation  Universal protocol:     Patient identity confirmed:  Arm band and hospital-assigned identification number  Pre-procedure details:     Indications comment:  Cardiac arrest    Pharmacologic strategy: none      Induction agents:  None    Paralytics:  None  Procedure details:     Preoxygenation:  Bag valve mask    Number of attempts:  1  Successful intubation attempt details:     Intubation method:  Oral    Intubation technique: video assisted      Laryngoscope blade:  Hypercurved    Bougie used: no      Tube size (mm):  7.5    Tube type:  Cuffed    Tube visualized through cords: yes    Placement assessment:     Tube secured with:  ETT peters    Breath sounds:  Equal    Placement verification: chest rise and colorimetric ETCO2    Post-procedure details:     Procedure completion:  Tolerated well, no immediate complications

## 2024-03-06 NOTE — PROGRESS NOTES
[facility]   PROGRESS NOTE      Patient: Wilian Bill  Room #: Bereave C/Bereave C            YOB: 1965  Age: 58 y.o.  Gender: male            Admit Date & Time: 3/5/2024  9:22 PM    Situation:    This is a follow-up spiritual health encounter in response to family request for  presence.  met with family, including spouse, Jennifer; two daughters, Elizabeth and Alyce; parents; siblings; and friends in the bereavement room.     Assessment:  Family shared stories about patient and expressed the feelings of numbness surrounding the idea of him being gone. Patient's mother tearfully shared about her deep feelings of loss and things she had hoped for her son.    Intervention:   provided empathic listening, supportive presence, facilitated storytelling and collaborated with medical team to relay decedent care information and processes.     Plan:   team will remain available to support family, PRN.       Electronically signed by Chaplain Antonio Manzano M.Div, on 3/6/2024 at 4:29 AM.     Spiritual Care Department  [place] [id number]  [phone]